# Patient Record
(demographics unavailable — no encounter records)

---

## 2024-11-11 NOTE — HISTORY OF PRESENT ILLNESS
[TextBox_4] : Mr. Freire is a 60-year-old male who presents for initial pulmonary evaluation.  Patient had a CT heart calcium score which demonstrated nodule groundglass opacity in the left lower lobe.  There was also a mildly enlarged lymph node in the AP window.  Mr. Freire does have an occasional cough.  He has a remote smoking history.  Patient denies any shortness of breath, hemoptysis or weight loss.

## 2024-11-11 NOTE — DISCUSSION/SUMMARY
[FreeTextEntry1] : Mr. Freire is a 60-year-old male who presents for initial pulmonary evaluation.  He had a CT heart calcium score which demonstrated groundglass opacity in the left lower lobe.  There is also a slightly enlarged lymph node in the aortopulmonary window.  Patient has a remote history of smoking.  He is asymptomatic.  Mr. Freire will be scheduled for a CT scan of the chest with contrast to further evaluate left lower lobe groundglass opacity as well as slightly enlarged lymph node.  He will follow-up after review of CT scan.